# Patient Record
Sex: MALE | Race: WHITE | NOT HISPANIC OR LATINO | Employment: FULL TIME | URBAN - NONMETROPOLITAN AREA
[De-identification: names, ages, dates, MRNs, and addresses within clinical notes are randomized per-mention and may not be internally consistent; named-entity substitution may affect disease eponyms.]

---

## 2023-03-01 ENCOUNTER — APPOINTMENT (EMERGENCY)
Dept: RADIOLOGY | Facility: HOSPITAL | Age: 23
End: 2023-03-01

## 2023-03-01 ENCOUNTER — HOSPITAL ENCOUNTER (EMERGENCY)
Facility: HOSPITAL | Age: 23
Discharge: HOME | End: 2023-03-01
Attending: EMERGENCY MEDICINE | Admitting: EMERGENCY MEDICINE

## 2023-03-01 ENCOUNTER — APPOINTMENT (EMERGENCY)
Dept: CT IMAGING | Facility: HOSPITAL | Age: 23
End: 2023-03-01

## 2023-03-01 VITALS
TEMPERATURE: 97.9 F | HEART RATE: 55 BPM | SYSTOLIC BLOOD PRESSURE: 128 MMHG | OXYGEN SATURATION: 99 % | DIASTOLIC BLOOD PRESSURE: 80 MMHG | RESPIRATION RATE: 16 BRPM | WEIGHT: 131.84 LBS

## 2023-03-01 DIAGNOSIS — R07.9 CHEST PAIN: Primary | ICD-10-CM

## 2023-03-01 LAB
ALBUMIN SERPL BCP-MCNC: 4.7 G/DL (ref 3.5–5)
ALP SERPL-CCNC: 70 U/L (ref 34–104)
ALT SERPL W P-5'-P-CCNC: 10 U/L (ref 7–52)
ANION GAP SERPL CALCULATED.3IONS-SCNC: 3 MMOL/L (ref 4–13)
APTT PPP: 29 SECONDS (ref 23–37)
AST SERPL W P-5'-P-CCNC: 14 U/L (ref 13–39)
BASOPHILS # BLD AUTO: 0.04 THOUSANDS/ÂΜL (ref 0–0.1)
BASOPHILS NFR BLD AUTO: 1 % (ref 0–1)
BILIRUB SERPL-MCNC: 1.16 MG/DL (ref 0.2–1)
BNP SERPL-MCNC: 15 PG/ML (ref 0–100)
BUN SERPL-MCNC: 18 MG/DL (ref 5–25)
CALCIUM SERPL-MCNC: 9.6 MG/DL (ref 8.4–10.2)
CARDIAC TROPONIN I PNL SERPL HS: <2 NG/L
CHLORIDE SERPL-SCNC: 104 MMOL/L (ref 96–108)
CK SERPL-CCNC: 109 U/L (ref 39–308)
CO2 SERPL-SCNC: 32 MMOL/L (ref 21–32)
CREAT SERPL-MCNC: 1.13 MG/DL (ref 0.6–1.3)
D DIMER PPP FEU-MCNC: 0.65 UG/ML FEU
EOSINOPHIL # BLD AUTO: 0.02 THOUSAND/ÂΜL (ref 0–0.61)
EOSINOPHIL NFR BLD AUTO: 1 % (ref 0–6)
ERYTHROCYTE [DISTWIDTH] IN BLOOD BY AUTOMATED COUNT: 12.3 % (ref 11.6–15.1)
GFR SERPL CREATININE-BSD FRML MDRD: 91 ML/MIN/1.73SQ M
GLUCOSE SERPL-MCNC: 68 MG/DL (ref 65–140)
HCT VFR BLD AUTO: 45.6 % (ref 36.5–49.3)
HGB BLD-MCNC: 14.9 G/DL (ref 12–17)
IMM GRANULOCYTES # BLD AUTO: 0.01 THOUSAND/UL (ref 0–0.2)
IMM GRANULOCYTES NFR BLD AUTO: 0 % (ref 0–2)
INR PPP: 1.1 (ref 0.84–1.19)
LACTATE SERPL-SCNC: 0.8 MMOL/L (ref 0.5–2)
LYMPHOCYTES # BLD AUTO: 1.22 THOUSANDS/ÂΜL (ref 0.6–4.47)
LYMPHOCYTES NFR BLD AUTO: 28 % (ref 14–44)
MCH RBC QN AUTO: 29.3 PG (ref 26.8–34.3)
MCHC RBC AUTO-ENTMCNC: 32.7 G/DL (ref 31.4–37.4)
MCV RBC AUTO: 90 FL (ref 82–98)
MONOCYTES # BLD AUTO: 0.3 THOUSAND/ÂΜL (ref 0.17–1.22)
MONOCYTES NFR BLD AUTO: 7 % (ref 4–12)
NEUTROPHILS # BLD AUTO: 2.83 THOUSANDS/ÂΜL (ref 1.85–7.62)
NEUTS SEG NFR BLD AUTO: 63 % (ref 43–75)
NRBC BLD AUTO-RTO: 0 /100 WBCS
PLATELET # BLD AUTO: 221 THOUSANDS/UL (ref 149–390)
PMV BLD AUTO: 11.2 FL (ref 8.9–12.7)
POTASSIUM SERPL-SCNC: 3.8 MMOL/L (ref 3.5–5.3)
PROT SERPL-MCNC: 7.5 G/DL (ref 6.4–8.4)
PROTHROMBIN TIME: 14.3 SECONDS (ref 11.6–14.5)
RBC # BLD AUTO: 5.08 MILLION/UL (ref 3.88–5.62)
SODIUM SERPL-SCNC: 139 MMOL/L (ref 135–147)
WBC # BLD AUTO: 4.42 THOUSAND/UL (ref 4.31–10.16)

## 2023-03-01 RX ADMIN — IOHEXOL 65 ML: 350 INJECTION, SOLUTION INTRAVENOUS at 15:22

## 2023-03-01 RX ADMIN — SODIUM CHLORIDE 1000 ML: 0.9 INJECTION, SOLUTION INTRAVENOUS at 14:13

## 2023-03-01 NOTE — Clinical Note
Taychau Samuel was seen and treated in our emergency department on 3/1/2023  off work today    Diagnosis:     Everette    He may return on this date: If you have any questions or concerns, please don't hesitate to call        Carina Peters DO    ______________________________           _______________          _______________  Hospital Representative                              Date                                Time

## 2023-03-01 NOTE — ED PROVIDER NOTES
History  Chief Complaint   Patient presents with   • Chest Pain     Chest pain started over 2 weeks ago, has not been the the Dr in over 6 years  States his pain feels like a knot in his left chest and pain increased with movement or a deep breath, admit to vaping  Patient is a 76-year-old male no significant past medical or surgical history presents to the emergency department complaining of pain in the left lower chest started about 2 weeks ago intermittent worse with certain movements and deep breathing feels like a dull ache  No shortness of breath no other symptoms  History provided by:  Patient  Chest Pain  Pain location:  L lateral chest  Pain quality: aching and dull    Pain radiates to:  Does not radiate  Pain radiates to the back: no    Pain severity:  Mild  Onset quality:  Gradual  Duration:  2 weeks  Timing:  Constant  Progression:  Worsening  Chronicity:  New  Associated symptoms: no abdominal pain, no cough, no dizziness, no fatigue, no fever, no headache, no nausea, no numbness, no palpitations, no shortness of breath, not vomiting and no weakness        None       History reviewed  No pertinent past medical history  History reviewed  No pertinent surgical history  History reviewed  No pertinent family history  I have reviewed and agree with the history as documented  E-Cigarette/Vaping   • E-Cigarette Use Current Every Day User      E-Cigarette/Vaping Substances   • Nicotine Yes      Social History     Tobacco Use   • Smoking status: Never   • Smokeless tobacco: Never   Vaping Use   • Vaping Use: Every day   • Substances: Nicotine   Substance Use Topics   • Alcohol use: Never   • Drug use: Yes     Types: Marijuana       Review of Systems   Constitutional: Negative for activity change, appetite change, chills, fatigue and fever  HENT: Negative for congestion, ear pain, rhinorrhea and sore throat  Eyes: Negative for discharge, redness and visual disturbance     Respiratory: Negative for cough, chest tightness, shortness of breath and wheezing  Cardiovascular: Positive for chest pain  Negative for palpitations  Gastrointestinal: Negative for abdominal pain, constipation, diarrhea, nausea and vomiting  Endocrine: Negative for polydipsia and polyuria  Genitourinary: Negative for difficulty urinating, dysuria, frequency, hematuria and urgency  Musculoskeletal: Negative for arthralgias and myalgias  Skin: Negative for color change, pallor and rash  Neurological: Negative for dizziness, weakness, light-headedness, numbness and headaches  Hematological: Negative for adenopathy  Does not bruise/bleed easily  All other systems reviewed and are negative  Physical Exam  Physical Exam  Vitals and nursing note reviewed  Constitutional:       Appearance: He is well-developed  HENT:      Head: Normocephalic and atraumatic  Right Ear: External ear normal       Left Ear: External ear normal       Nose: Nose normal    Eyes:      Conjunctiva/sclera: Conjunctivae normal       Pupils: Pupils are equal, round, and reactive to light  Cardiovascular:      Rate and Rhythm: Normal rate and regular rhythm  Heart sounds: Normal heart sounds  Pulmonary:      Effort: Pulmonary effort is normal  No respiratory distress  Breath sounds: Normal breath sounds  No wheezing or rales  Chest:      Chest wall: Tenderness present  Abdominal:      General: Bowel sounds are normal  There is no distension  Palpations: Abdomen is soft  Tenderness: There is no abdominal tenderness  There is no guarding  Musculoskeletal:         General: Normal range of motion  Cervical back: Normal range of motion and neck supple  Skin:     General: Skin is warm and dry  Neurological:      Mental Status: He is alert and oriented to person, place, and time  Cranial Nerves: No cranial nerve deficit  Sensory: No sensory deficit           Vital Signs  ED Triage Vitals [03/01/23 1324]   Temperature Pulse Respirations Blood Pressure SpO2   97 9 °F (36 6 °C) 63 16 92/51 96 %      Temp Source Heart Rate Source Patient Position - Orthostatic VS BP Location FiO2 (%)   Oral Monitor Sitting Left arm --      Pain Score       6           Vitals:    03/01/23 1324 03/01/23 1500 03/01/23 1515   BP: 92/51 116/70 118/69   Pulse: 63 55 55   Patient Position - Orthostatic VS: Sitting           Visual Acuity      ED Medications  Medications   sodium chloride 0 9 % bolus 1,000 mL (1,000 mL Intravenous New Bag 3/1/23 1413)   iohexol (OMNIPAQUE) 350 MG/ML injection (SINGLE-DOSE) 65 mL (65 mL Intravenous Given 3/1/23 1522)       Diagnostic Studies  Results Reviewed     Procedure Component Value Units Date/Time    D-Dimer [[id number]]  (Abnormal) Collected: 03/01/23 1413    Lab Status: Final result Specimen: Blood from Arm, Left Updated: 03/01/23 1500     D-Dimer, Quant 0 65 ug/ml FEU     Protime-INR [[id number]]  (Normal) Collected: 03/01/23 1413    Lab Status: Final result Specimen: Blood from Arm, Left Updated: 03/01/23 1453     Protime 14 3 seconds      INR 1 10    APTT [[id number]]  (Normal) Collected: 03/01/23 1413    Lab Status: Final result Specimen: Blood from Arm, Left Updated: 03/01/23 1453     PTT 29 seconds     HS Troponin 0hr (reflex protocol) [[id number]]  (Normal) Collected: 03/01/23 1413    Lab Status: Final result Specimen: Blood from Arm, Left Updated: 03/01/23 1450     hs TnI 0hr <2 ng/L     B-Type Natriuretic Peptide(BNP) [[id number]]  (Normal) Collected: 03/01/23 1413    Lab Status: Final result Specimen: Blood from Arm, Left Updated: 03/01/23 1449     BNP 15 pg/mL     Lactic acid [[id number]]  (Normal) Collected: 03/01/23 1413    Lab Status: Final result Specimen: Blood from Arm, Left Updated: 03/01/23 1447     LACTIC ACID 0 8 mmol/L     Narrative:      Result may be elevated if tourniquet was used during collection      Comprehensive metabolic panel [[id number]]  (Abnormal) Collected: 03/01/23 1413    Lab Status: Final result Specimen: Blood from Arm, Left Updated: 03/01/23 1447     Sodium 139 mmol/L      Potassium 3 8 mmol/L      Chloride 104 mmol/L      CO2 32 mmol/L      ANION GAP 3 mmol/L      BUN 18 mg/dL      Creatinine 1 13 mg/dL      Glucose 68 mg/dL      Calcium 9 6 mg/dL      AST 14 U/L      ALT 10 U/L      Alkaline Phosphatase 70 U/L      Total Protein 7 5 g/dL      Albumin 4 7 g/dL      Total Bilirubin 1 16 mg/dL      eGFR 91 ml/min/1 73sq m     Narrative:      National Kidney Disease Foundation guidelines for Chronic Kidney Disease (CKD):   •  Stage 1 with normal or high GFR (GFR > 90 mL/min/1 73 square meters)  •  Stage 2 Mild CKD (GFR = 60-89 mL/min/1 73 square meters)  •  Stage 3A Moderate CKD (GFR = 45-59 mL/min/1 73 square meters)  •  Stage 3B Moderate CKD (GFR = 30-44 mL/min/1 73 square meters)  •  Stage 4 Severe CKD (GFR = 15-29 mL/min/1 73 square meters)  •  Stage 5 End Stage CKD (GFR <15 mL/min/1 73 square meters)  Note: GFR calculation is accurate only with a steady state creatinine    CK Total with Reflex CKMB [[id number]]  (Normal) Collected: 03/01/23 1413    Lab Status: Final result Specimen: Blood from Arm, Left Updated: 03/01/23 1447     Total  U/L     CBC and differential [[id number]] Collected: 03/01/23 1413    Lab Status: Final result Specimen: Blood from Arm, Left Updated: 03/01/23 1422     WBC 4 42 Thousand/uL      RBC 5 08 Million/uL      Hemoglobin 14 9 g/dL      Hematocrit 45 6 %      MCV 90 fL      MCH 29 3 pg      MCHC 32 7 g/dL      RDW 12 3 %      MPV 11 2 fL      Platelets 209 Thousands/uL      nRBC 0 /100 WBCs      Neutrophils Relative 63 %      Immat GRANS % 0 %      Lymphocytes Relative 28 %      Monocytes Relative 7 %      Eosinophils Relative 1 %      Basophils Relative 1 %      Neutrophils Absolute 2 83 Thousands/µL      Immature Grans Absolute 0 01 Thousand/uL      Lymphocytes Absolute 1 22 Thousands/µL      Monocytes Absolute 0 30 Thousand/µL Eosinophils Absolute 0 02 Thousand/µL      Basophils Absolute 0 04 Thousands/µL                  CTA ED chest PE study   Final Result by Deandre Orozco MD (03/01 1886)      No pulmonary embolus  Lungs clear  Workstation performed: [id number]         XR chest 1 view portable   ED Interpretation by Sharyn Chakraborty DO (03/01 1506)   No acute cardiopulmonary disease                 Procedures  ECG 12 Lead Documentation Only    Date/Time: 3/1/2023 2:05 PM  Performed by: Sharyn Chakraborty DO  Authorized by: Sharyn Chakraborty DO     ECG reviewed by me, the ED Provider: yes    Patient location:  ED  Previous ECG:     Comparison to cardiac monitor: Yes    Quality:     Tracing quality:  Limited by artifact  Rate:     ECG rate:  56    ECG rate assessment: bradycardic    QRS:     QRS axis:  Normal    QRS intervals:  Normal  Conduction:     Conduction: normal    ST segments:     ST segments:  Normal  T waves:     T waves: normal    Other findings:     Other findings: early repolarization               ED Course             HEART Risk Score    Flowsheet Row Most Recent Value   Heart Score Risk Calculator    History 0 Filed at: 03/01/2023 1506   ECG 0 Filed at: 03/01/2023 1506   Age 0 Filed at: 03/01/2023 1506   Risk Factors 0 Filed at: 03/01/2023 1506   Troponin 0 Filed at: 03/01/2023 1506   HEART Score 0 Filed at: 03/01/2023 1506                                      Medical Decision Making  Remained clinically and hemodynamically stable in the emergency department work-up in the ED reveals no evidence of acute cardiopulmonary pathology heart score 0 very low risk for major adverse cardiac event symptoms ongoing greater than 3 hours actually 2 weeks now    Negative high-sensitivity troponin in the ED recommendation based on ACS algorithm is for DC with PCP follow-up patient comfortable and in agreement with this chest pain is somewhat reproducible may be musculoskeletal in nature advised port of care and prompt follow-up with primary physician for further evaluation and treatment and obtain test results return precautions and anticipatory guidance discussed  Chest pain: acute illness or injury  Amount and/or Complexity of Data Reviewed  Labs: ordered  Decision-making details documented in ED Course  Radiology: ordered and independent interpretation performed  Decision-making details documented in ED Course  ECG/medicine tests: ordered and independent interpretation performed  Decision-making details documented in ED Course  Risk  Prescription drug management  Disposition  Final diagnoses:   Chest pain     Time reflects when diagnosis was documented in both MDM as applicable and the Disposition within this note     Time User Action Codes Description Comment    3/1/2023  3:07 PM Virgil Pro Add [R07 9] Chest pain       ED Disposition     ED Disposition   Discharge    Condition   Stable    Date/Time   Wed Mar 1, 2023  3:45 PM    Comment   Everette Bass discharge to home/self care  Follow-up Information     Follow up With Specialties Details Why [address] [facility]  Family Medicine Schedule an appointment as soon as possible for a visit in 3 days  [address] [facility] [address] [phone]            Patient's Medications    No medications on file       No discharge procedures on file      PDMP Review     None          ED Provider  Electronically Signed by               Joe Pineda DO  03/01/23 7336

## 2023-03-10 LAB
ATRIAL RATE: 56 BPM
P AXIS: 75 DEGREES
PR INTERVAL: 146 MS
QRS AXIS: 84 DEGREES
QRSD INTERVAL: 96 MS
QT INTERVAL: 426 MS
QTC INTERVAL: 411 MS
T WAVE AXIS: 69 DEGREES
VENTRICULAR RATE: 56 BPM

## 2024-03-07 ENCOUNTER — OFFICE VISIT (OUTPATIENT)
Dept: URGENT CARE | Facility: CLINIC | Age: 24
End: 2024-03-07
Payer: COMMERCIAL

## 2024-03-07 VITALS
RESPIRATION RATE: 18 BRPM | DIASTOLIC BLOOD PRESSURE: 62 MMHG | HEIGHT: 72 IN | OXYGEN SATURATION: 99 % | TEMPERATURE: 97 F | WEIGHT: 140 LBS | HEART RATE: 94 BPM | SYSTOLIC BLOOD PRESSURE: 118 MMHG | BODY MASS INDEX: 18.96 KG/M2

## 2024-03-07 DIAGNOSIS — K08.89 PAIN, DENTAL: Primary | ICD-10-CM

## 2024-03-07 PROCEDURE — 99213 OFFICE O/P EST LOW 20 MIN: CPT

## 2024-03-07 RX ORDER — AMOXICILLIN AND CLAVULANATE POTASSIUM 875; 125 MG/1; MG/1
1 TABLET, FILM COATED ORAL EVERY 12 HOURS SCHEDULED
Qty: 14 TABLET | Refills: 0 | Status: SHIPPED | OUTPATIENT
Start: 2024-03-07 | End: 2024-03-14

## 2024-03-07 NOTE — PATIENT INSTRUCTIONS
Take antibiotic as prescribed  OTC Tylenol/Ibuprofen for pain  Warm or cool compresses  Warm salt water rinses  Follow up with Dentistry  Monitor for signs of worsening infection   Follow up with PCP in 3-5 days.  Proceed to  ER if symptoms worsen.    If tests have been performed at [facility], our office will contact you with results if changes need to be made to the care plan discussed with you at the visit.  You can review your full results on [facility] MyChart.    Dental Abscess   AMBULATORY CARE:   A dental abscess  is a collection of pus in or around a tooth. A dental abscess is caused by bacteria. The bacteria can enter the tooth when the enamel (outer part of the tooth) is damaged by tooth decay. Bacteria can also enter the tooth through a chip in the tooth or a cut in the gum. Food particles that are stuck between the teeth for a long time may also lead to an abscess.        Common signs and symptoms:   Toothache, a loose tooth, or a tooth that is very sensitive to pressure or temperature    Bad breath, unpleasant taste, and drooling    Fever    Pain, redness, and swelling of the gums, or swelling of your face and neck    Pain when you open or close your mouth    Trouble opening your mouth    Seek care immediately if:   You have severe pain in your tooth or jaw.    You have trouble breathing because of pain or swelling.    Call your doctor if:   Your symptoms get worse, even after treatment.    Your mouth is bleeding.    You cannot eat or drink because of pain or swelling.    Your abscess returns.    You have an injury that causes a crack in your tooth.    You have questions or concerns about your condition or care.    Treatment:  You may  need any of the following:  Medicines  may be given to treat a bacterial infection and decrease pain.     Incision and drainage  is a cut in the abscess to allow the pus to drain. A sample of fluid may be collected from your abscess. The fluid is sent to a lab and tested  for bacteria. Ask your healthcare provider for more information.    A root canal  is a procedure to remove the bacteria and prevent more infection. It is usually done after an incision and drainage. A filling or crown will be placed over the tooth after you have healed from your root canal.     Tooth removal  may be needed if the infection affects deeper tissues. This is usually done after an incision and drainage.    Self-care:   Rinse your mouth every 2 hours with salt water.  This will help keep the area clean.     Gently brush your teeth twice a day with a soft tooth brush.  This will help keep the area clean.     Eat soft foods as directed.  Soft foods may cause less pain. Examples include applesauce, yogurt, and cooked pasta. Ask your healthcare provider how long to follow this instruction.     Apply a warm compress to your tooth or gum.  Use a cotton ball or gauze soaked in warm water. Remove the compress in 10 minutes or when it becomes cool. Repeat 3 times a day.    Prevent another abscess:   Brush your teeth at least 2 times a day  with fluoride toothpaste.    Use dental floss at least once a day  to clean between your teeth.    Rinse your mouth with water or mouthwash  after meals and snacks. Chew sugarless gum.    Avoid sugary and starchy food that can stick between your teeth.  Limit drinks high in sugar, such as soda or fruit juice.    See your dentist every 6 months  for dental cleanings and oral exams.    Follow up with your doctor or dentist as directed:  Your healthcare provider will need to check your teeth and gums. Write down your questions so you remember to ask them during your visits.   © Copyright [employer] 2023 Information is for End User's use only and may not be sold, redistributed or otherwise used for commercial purposes.  The above information is an  only. It is not intended as medical advice for individual conditions or treatments. Talk to your doctor, nurse or  pharmacist before following any medical regimen to see if it is safe and effective for you.

## 2024-03-07 NOTE — LETTER
March 11, 2024     Patient: Everette Carson   YOB: 2000   Date of Visit: 3/7/2024       To Whom It May Concern:    It is my medical opinion that Everette Carson may return to work on 03/09/24 .    If you have any questions or concerns, please don't hesitate to call.         Sincerely,        ANJEL Agrawal    CC: No Recipients

## 2024-03-07 NOTE — LETTER
March 7, 2024     Patient: Everette Carson   YOB: 2000   Date of Visit: 3/7/2024       To Whom it May Concern:    Everette Carson was seen in my clinic on 3/7/2024. He may return to work on 3/8/2024 .    If you have any questions or concerns, please don't hesitate to call.         Sincerely,          ANEJL Agrawal        CC: No Recipients

## 2024-03-07 NOTE — PROGRESS NOTES
[facility]        NAME: Everette Carson is a 23 y.o. male  : 2000    MRN: [id number]  DATE: 2024  TIME: 1:00 PM    Assessment and Plan   Pain, dental [K08.89]  1. Pain, dental  amoxicillin-clavulanate (AUGMENTIN) 875-125 mg per tablet        Concern for dental infection and so we will treat with Augmentin.  Follow-up with dentistry, list of dental clinics provided. Encouraged continued supportive measures.  Follow up with PCP in 3-5 days or proceed to emergency department for worsening symptoms.  Patient verbalized understanding of instructions given.       Patient Instructions     Patient Instructions     Take antibiotic as prescribed  OTC Tylenol/Ibuprofen for pain  Warm or cool compresses  Warm salt water rinses  Follow up with Dentistry  Monitor for signs of worsening infection   Follow up with PCP in 3-5 days.  Proceed to  ER if symptoms worsen.    If tests have been performed at [facility], our office will contact you with results if changes need to be made to the care plan discussed with you at the visit.  You can review your full results on [facility] MyChart.    Dental Abscess   AMBULATORY CARE:   A dental abscess  is a collection of pus in or around a tooth. A dental abscess is caused by bacteria. The bacteria can enter the tooth when the enamel (outer part of the tooth) is damaged by tooth decay. Bacteria can also enter the tooth through a chip in the tooth or a cut in the gum. Food particles that are stuck between the teeth for a long time may also lead to an abscess.        Common signs and symptoms:   Toothache, a loose tooth, or a tooth that is very sensitive to pressure or temperature    Bad breath, unpleasant taste, and drooling    Fever    Pain, redness, and swelling of the gums, or swelling of your face and neck    Pain when you open or close your mouth    Trouble opening your mouth    Seek care immediately if:   You have severe pain in your tooth or jaw.    You have  trouble breathing because of pain or swelling.    Call your doctor if:   Your symptoms get worse, even after treatment.    Your mouth is bleeding.    You cannot eat or drink because of pain or swelling.    Your abscess returns.    You have an injury that causes a crack in your tooth.    You have questions or concerns about your condition or care.    Treatment:  You may  need any of the following:  Medicines  may be given to treat a bacterial infection and decrease pain.     Incision and drainage  is a cut in the abscess to allow the pus to drain. A sample of fluid may be collected from your abscess. The fluid is sent to a lab and tested for bacteria. Ask your healthcare provider for more information.    A root canal  is a procedure to remove the bacteria and prevent more infection. It is usually done after an incision and drainage. A filling or crown will be placed over the tooth after you have healed from your root canal.     Tooth removal  may be needed if the infection affects deeper tissues. This is usually done after an incision and drainage.    Self-care:   Rinse your mouth every 2 hours with salt water.  This will help keep the area clean.     Gently brush your teeth twice a day with a soft tooth brush.  This will help keep the area clean.     Eat soft foods as directed.  Soft foods may cause less pain. Examples include applesauce, yogurt, and cooked pasta. Ask your healthcare provider how long to follow this instruction.     Apply a warm compress to your tooth or gum.  Use a cotton ball or gauze soaked in warm water. Remove the compress in 10 minutes or when it becomes cool. Repeat 3 times a day.    Prevent another abscess:   Brush your teeth at least 2 times a day  with fluoride toothpaste.    Use dental floss at least once a day  to clean between your teeth.    Rinse your mouth with water or mouthwash  after meals and snacks. Chew sugarless gum.    Avoid sugary and starchy food that can stick between your  teeth.  Limit drinks high in sugar, such as soda or fruit juice.    See your dentist every 6 months  for dental cleanings and oral exams.    Follow up with your doctor or dentist as directed:  Your healthcare provider will need to check your teeth and gums. Write down your questions so you remember to ask them during your visits.   © Copyright [employer] 2023 Information is for End User's use only and may not be sold, redistributed or otherwise used for commercial purposes.  The above information is an  only. It is not intended as medical advice for individual conditions or treatments. Talk to your doctor, nurse or pharmacist before following any medical regimen to see if it is safe and effective for you.        Chief Complaint     Chief Complaint   Patient presents with    Dental Pain     C/o upper left tooth pain, with throbbing. Onset yesterday.          History of Present Illness       3-year-old male with no significant past medical history presents with complaints of left-sided upper dental pain x 2 days.  Patient reports history of root canal but does not follow regularly with dentist.  Denies fever, chills, or flulike symptoms.  No facial swelling.  He has been taking OTC Ibuprofen and did take a dose of a coworker's antibiotic (does not know which) and this provided some relief of symptoms.         Review of Systems   Review of Systems   Constitutional:  Negative for chills and fever.   HENT:  Positive for dental problem. Negative for congestion, ear discharge, ear pain, facial swelling, rhinorrhea, sore throat, trouble swallowing and voice change.    Eyes:  Negative for discharge.   Respiratory:  Negative for cough, shortness of breath and wheezing.    Cardiovascular:  Negative for chest pain.   Gastrointestinal:  Negative for abdominal pain, diarrhea, nausea and vomiting.   Skin:  Negative for rash.         Current Medications       Current Outpatient Medications:     amoxicillin-clavulanate  (AUGMENTIN) 875-125 mg per tablet, Take 1 tablet by mouth every 12 (twelve) hours for 7 days, Disp: 14 tablet, Rfl: 0    Current Allergies     Allergies as of 03/07/2024    (No Known Allergies)            The following portions of the patient's history were reviewed and updated as appropriate: allergies, current medications, past family history, past medical history, past social history, past surgical history and problem list.     History reviewed. No pertinent past medical history.    Past Surgical History:   Procedure Laterality Date    NO PAST SURGERIES         History reviewed. No pertinent family history.      Medications have been verified.        Objective   /62   Pulse 94   Temp (!) 97 °F (36.1 °C)   Resp 18   Ht 6' (1.829 m)   Wt 63.5 kg (140 lb)   SpO2 99%   BMI 18.99 kg/m²   No LMP for male patient.       Physical Exam     Physical Exam  Vitals and nursing note reviewed.   Constitutional:       General: He is not in acute distress.     Appearance: He is not toxic-appearing.   HENT:      Head: Normocephalic.      Nose: Nose normal.      Mouth/Throat:      Mouth: Mucous membranes are moist.      Dentition: Abnormal dentition. Dental tenderness and gingival swelling present.      Pharynx: Oropharynx is clear.     Eyes:      Conjunctiva/sclera: Conjunctivae normal.   Pulmonary:      Effort: Pulmonary effort is normal.   Skin:     General: Skin is warm and dry.   Neurological:      Mental Status: He is alert and oriented to person, place, and time.      Gait: Gait is intact.   Psychiatric:         Mood and Affect: Mood normal.         Behavior: Behavior normal.

## 2024-09-26 ENCOUNTER — APPOINTMENT (OUTPATIENT)
Dept: RADIOLOGY | Facility: CLINIC | Age: 24
End: 2024-09-26

## 2024-09-26 ENCOUNTER — APPOINTMENT (OUTPATIENT)
Dept: URGENT CARE | Facility: CLINIC | Age: 24
End: 2024-09-26

## 2024-09-26 ENCOUNTER — APPOINTMENT (OUTPATIENT)
Dept: PHYSICAL THERAPY | Facility: CLINIC | Age: 24
End: 2024-09-26

## 2024-09-26 DIAGNOSIS — Z02.1 PRE-EMPLOYMENT EXAMINATION: ICD-10-CM

## 2024-09-26 PROCEDURE — 97530 THERAPEUTIC ACTIVITIES: CPT

## 2024-09-26 PROCEDURE — 71046 X-RAY EXAM CHEST 2 VIEWS: CPT

## 2024-11-05 ENCOUNTER — APPOINTMENT (OUTPATIENT)
Dept: RADIOLOGY | Facility: CLINIC | Age: 24
End: 2024-11-05
Payer: COMMERCIAL

## 2024-11-05 ENCOUNTER — OFFICE VISIT (OUTPATIENT)
Dept: URGENT CARE | Facility: CLINIC | Age: 24
End: 2024-11-05
Payer: COMMERCIAL

## 2024-11-05 VITALS
HEIGHT: 71 IN | DIASTOLIC BLOOD PRESSURE: 80 MMHG | RESPIRATION RATE: 16 BRPM | SYSTOLIC BLOOD PRESSURE: 120 MMHG | OXYGEN SATURATION: 99 % | HEART RATE: 83 BPM | TEMPERATURE: 97.6 F | BODY MASS INDEX: 18.9 KG/M2 | WEIGHT: 135 LBS

## 2024-11-05 VITALS
OXYGEN SATURATION: 97 % | SYSTOLIC BLOOD PRESSURE: 128 MMHG | WEIGHT: 135 LBS | TEMPERATURE: 98.2 F | HEART RATE: 69 BPM | HEIGHT: 71 IN | DIASTOLIC BLOOD PRESSURE: 64 MMHG | BODY MASS INDEX: 18.9 KG/M2
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
## 2024-11-05 DIAGNOSIS — S62.511A CLOSED DISPLACED FRACTURE OF PROXIMAL PHALANX OF RIGHT THUMB, INITIAL ENCOUNTER: ICD-10-CM

## 2024-11-05 DIAGNOSIS — S62.511A CLOSED DISPLACED FRACTURE OF PROXIMAL PHALANX OF RIGHT THUMB, INITIAL ENCOUNTER: Primary | ICD-10-CM

## 2024-11-05 PROCEDURE — S9083 URGENT CARE CENTER GLOBAL: HCPCS

## 2024-11-05 PROCEDURE — 73140 X-RAY EXAM OF FINGER(S): CPT

## 2024-11-05 PROCEDURE — G0382 LEV 3 HOSP TYPE B ED VISIT: HCPCS

## 2024-11-05 PROCEDURE — 99204 OFFICE O/P NEW MOD 45 MIN: CPT | Performed by: STUDENT IN AN ORGANIZED HEALTH CARE EDUCATION/TRAINING PROGRAM

## 2024-11-05 RX ORDER — CEFAZOLIN SODIUM 2 G/50ML
2000 SOLUTION INTRAVENOUS ONCE
Status: CANCELLED | OUTPATIENT
Start: 2024-11-08 | End: 2024-11-05

## 2024-11-05 RX ORDER — CHLORHEXIDINE GLUCONATE ORAL RINSE 1.2 MG/ML
15 SOLUTION DENTAL ONCE
Status: CANCELLED | OUTPATIENT
Start: 2024-11-05 | End: 2024-11-05

## 2024-11-05 NOTE — PATIENT INSTRUCTIONS
"  Preliminary XR read concerning for fracture of thumb, final read pending  Thumb brace  Rest, Ice, Compression, and Elevation  OTC Tylenol/Ibuprofen for pain  Referral placed to Orthopedic Surgery (Hand)   Follow up with PCP in 3-5 days.  Proceed to  ER if symptoms worsen.    If tests have been performed at [facility], our office will contact you with results if changes need to be made to the care plan discussed with you at the visit.  You can review your full results on [facility] MyChart.      Patient Education     Finger fracture   The Basics   Written by the doctors and editors at [facility]   What is a finger fracture? -- A \"fracture\" is another word for a broken bone. The finger bones are also called the \"phalanges\" (figure 1).  There are different types of fractures, depending on which bone breaks and how it breaks. When a bone breaks, it might crack, break all of the way through, or shatter.  Fractured fingers can happen if a finger is hit, twisted, or bent too far.  What are the symptoms of a finger fracture? -- Symptoms depend on which bone breaks and the kind of break it is. Common symptoms can include:   Pain, swelling, or bruising over the area   Finger looks bent in an abnormal position or is not the usual shape   Not being able to bend or move the finger   Trouble making a fist or grasping things with the fingers   Numbness in the area of the broken bone  Is there a test for a finger fracture? -- Yes. Your doctor or nurse will ask about your symptoms, do an exam, and do an X-ray. They might do other imaging tests, such as a CT, MRI, or ultrasound. Imaging tests create pictures of the inside of the body.  How are finger fractures treated? -- Treatment depends, in part, on the type of finger fracture you have and how severe it is. The goal of treatment is to have the ends of the broken bone line up with each other so that the bone can heal.  If the ends of the broken bone are already in line with each " "other, finger fractures are usually treated with a splint, \"adri taping,\" or both. Adri taping involves taping your injured finger to the finger next to it (picture 1).  If the ends of your broken bone are not in line with each other, the doctor will need to line them up:   Sometimes, the doctor can move the bone to the correct position without doing surgery, and then put a splint on or adri tape your fingers. This is called \"closed fracture reduction.\"   A severe finger fracture, in which a joint is damaged or the bones do not stay in position, is treated with surgery. During surgery, the doctor puts the finger bone back in position. To do this, they can use screws, pins, rods, or plates to fix the bones inside the finger. This is called \"open fracture reduction.\"  How long do finger fractures take to heal? -- Most finger fractures take weeks to months to heal, depending on the type of fracture. The doctor or nurse will talk to you about when to return to things like work, sports, or other activities.  Healing time also depends on the person. Healthy children usually heal much more quickly than older adults or adults with other medical problems.  How do I care for myself at home? -- To care for yourself or your child at home:   Follow the doctor's instructions for wearing the splint or adri taping the finger. This supports and protects the bone as it heals.   Follow instructions for limiting activity and movement until the bone is healed. The doctor or nurse will tell you what activities are safe to do.   Prop the injured hand on pillows, keeping it above the level of the heart. This might help lessen pain and swelling.   The doctor might recommend an over-the-counter pain medicine. These include acetaminophen (sample brand name: Tylenol), ibuprofen (sample brand names: Advil, Motrin), and naproxen (sample brand name: Aleve).   Some people get a prescription for stronger pain medicines to take for a short time. " "Follow the instructions for taking these medicines.   Ice can help with pain and swelling:   Put a cold gel pack, bag of ice, or bag of frozen vegetables on the injured area every 1 to 2 hours, for 15 minutes each time. Put a thin towel between the ice (or other cold object) and your skin.   Use the ice (or other cold object) for at least 6 hours after your injury. Some people find it helpful to ice longer, even up to 2 days after their injury.   Eat a healthy diet that includes plenty of calcium, vitamin D, and protein (figure 2).   If you smoke, try to quit. Broken bones take longer to heal if you smoke.   You might need to work with a physical therapist (exercise expert) after your fracture heals. The physical therapist will show you exercises and stretches to strengthen the hand and finger muscles and keep them from getting stiff.  When should I call the doctor? -- Call for advice if:   There is less feeling or movement in your fingers.   The finger becomes swollen or starts to hurt more.   The splint becomes too tight and uncomfortable.   The fingers are numb or tingly, or turn pale, blue, or gray.  All topics are updated as new evidence becomes available and our peer review process is complete.  This topic retrieved from [employer] on: Feb 26, 2024.  Topic 59814 Version 11.0  Release: 32.2.4 - C32.56  © 2024 UpToDate, Inc. and/or its affiliates. All rights reserved.  figure 1: Hand and wrist bones     This drawing shows the bones of the hand and wrist.  Graphic 43742 Version 2.0  picture 1: Finger adri taping     Finger adri taping involves taping an injured finger to the finger next to it.  Graphic 78134 Version 2.0  figure 2: Foods and drinks with calcium and vitamin D     Foods rich in calcium include ice cream, soy milk, breads, kale, broccoli, milk, cheese, cottage cheese, almonds, yogurt, ready-to-eat cereals, beans, and tofu. Foods rich in vitamin D include milk, fortified plant-based \"milks\" (soy, " almond), canned tuna fish, cod liver oil, yogurt, ready-to-eat-cereals, cooked salmon, canned sardines, mackerel, and eggs. Some of these foods are rich in both.  Graphic 64815 Version 4.0  Consumer Information Use and Disclaimer   Disclaimer: This generalized information is a limited summary of diagnosis, treatment, and/or medication information. It is not meant to be comprehensive and should be used as a tool to help the user understand and/or assess potential diagnostic and treatment options. It does NOT include all information about conditions, treatments, medications, side effects, or risks that may apply to a specific patient. It is not intended to be medical advice or a substitute for the medical advice, diagnosis, or treatment of a health care provider based on the health care provider's examination and assessment of a patient's specific and unique circumstances. Patients must speak with a health care provider for complete information about their health, medical questions, and treatment options, including any risks or benefits regarding use of medications. This information does not endorse any treatments or medications as safe, effective, or approved for treating a specific patient. UpToDate, Inc. and its affiliates disclaim any warranty or liability relating to this information or the use thereof.The use of this information is governed by the Terms of Use, available at https://www.woltersMuckRockuwer.com/en/know/clinical-effectiveness-terms. 2024© UpToDate, Inc. and its affiliates and/or licensors. All rights reserved.  Copyright   © 2024 UpToDate, Inc. and/or its affiliates. All rights reserved.

## 2024-11-05 NOTE — LETTER
November 5, 2024     Patient: Everette Carson   YOB: 2000   Date of Visit: 11/5/2024       To Whom it May Concern:    Everette Carson was seen in my clinic on 11/5/2024. He may return to work on 11/6/24 .    If you have any questions or concerns, please don't hesitate to call.         Sincerely,          ANJEL Agrawal        CC: No Recipients
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
## 2024-11-06 RX ORDER — IBUPROFEN 200 MG
TABLET ORAL EVERY 6 HOURS PRN
COMMUNITY
End: 2024-11-08

## 2024-11-06 RX ORDER — ACETAMINOPHEN 500 MG
500 TABLET ORAL EVERY 6 HOURS PRN
Status: ON HOLD | COMMUNITY
End: 2024-11-08
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
## 2024-11-07 ENCOUNTER — ANESTHESIA EVENT (OUTPATIENT)
Dept: PERIOP | Facility: HOSPITAL | Age: 24
End: 2024-11-07
Payer: COMMERCIAL

## 2024-11-08 ENCOUNTER — APPOINTMENT (OUTPATIENT)
Dept: RADIOLOGY | Facility: HOSPITAL | Age: 24
End: 2024-11-08
Payer: COMMERCIAL

## 2024-11-08 ENCOUNTER — ANESTHESIA (OUTPATIENT)
Dept: PERIOP | Facility: HOSPITAL | Age: 24
End: 2024-11-08
Payer: COMMERCIAL

## 2024-11-08 ENCOUNTER — HOSPITAL ENCOUNTER (OUTPATIENT)
Facility: HOSPITAL | Age: 24
Setting detail: OUTPATIENT SURGERY
Discharge: HOME | End: 2024-11-08
Attending: STUDENT IN AN ORGANIZED HEALTH CARE EDUCATION/TRAINING PROGRAM | Admitting: STUDENT IN AN ORGANIZED HEALTH CARE EDUCATION/TRAINING PROGRAM
Payer: COMMERCIAL

## 2024-11-08 VITALS
OXYGEN SATURATION: 100 % | RESPIRATION RATE: 16 BRPM | DIASTOLIC BLOOD PRESSURE: 79 MMHG | SYSTOLIC BLOOD PRESSURE: 131 MMHG | TEMPERATURE: 97.3 F | HEART RATE: 62 BPM

## 2024-11-08 DIAGNOSIS — Z87.81 S/P ORIF (OPEN REDUCTION INTERNAL FIXATION) FRACTURE: Primary | ICD-10-CM

## 2024-11-08 DIAGNOSIS — Z98.890 S/P ORIF (OPEN REDUCTION INTERNAL FIXATION) FRACTURE: Primary | ICD-10-CM

## 2024-11-08 PROBLEM — F19.11 SUBSTANCE ABUSE IN REMISSION (HCC): Status: ACTIVE | Noted: 2024-11-08

## 2024-11-08 PROCEDURE — C1713 ANCHOR/SCREW BN/BN,TIS/BN: HCPCS | Performed by: STUDENT IN AN ORGANIZED HEALTH CARE EDUCATION/TRAINING PROGRAM

## 2024-11-08 PROCEDURE — NC001 PR NO CHARGE: Performed by: STUDENT IN AN ORGANIZED HEALTH CARE EDUCATION/TRAINING PROGRAM

## 2024-11-08 PROCEDURE — 73140 X-RAY EXAM OF FINGER(S): CPT

## 2024-11-08 PROCEDURE — 26735 TREAT FINGER FRACTURE EACH: CPT | Performed by: STUDENT IN AN ORGANIZED HEALTH CARE EDUCATION/TRAINING PROGRAM

## 2024-11-08 DEVICE — INFRAME IS A STAINLESS STEEL BONE SCREW.
Type: IMPLANTABLE DEVICE | Site: THUMB | Status: FUNCTIONAL
Brand: INFRAME IMPLANT

## 2024-11-08 RX ORDER — DEXAMETHASONE SODIUM PHOSPHATE 10 MG/ML
INJECTION, SOLUTION INTRAMUSCULAR; INTRAVENOUS AS NEEDED
Status: DISCONTINUED | OUTPATIENT
Start: 2024-11-08 | End: 2024-11-08

## 2024-11-08 RX ORDER — DEXMEDETOMIDINE HYDROCHLORIDE 4 UG/ML
INJECTION, SOLUTION INTRAVENOUS AS NEEDED
Status: DISCONTINUED | OUTPATIENT
Start: 2024-11-08 | End: 2024-11-08

## 2024-11-08 RX ORDER — CHLORHEXIDINE GLUCONATE ORAL RINSE 1.2 MG/ML
15 SOLUTION DENTAL ONCE
Status: COMPLETED | OUTPATIENT
Start: 2024-11-08 | End: 2024-11-08

## 2024-11-08 RX ORDER — ACETAMINOPHEN 10 MG/ML
INJECTION, SOLUTION INTRAVENOUS AS NEEDED
Status: DISCONTINUED | OUTPATIENT
Start: 2024-11-08 | End: 2024-11-08

## 2024-11-08 RX ORDER — FENTANYL CITRATE 50 UG/ML
INJECTION, SOLUTION INTRAMUSCULAR; INTRAVENOUS CONTINUOUS PRN
Status: DISCONTINUED | OUTPATIENT
Start: 2024-11-08 | End: 2024-11-08

## 2024-11-08 RX ORDER — LIDOCAINE HYDROCHLORIDE AND EPINEPHRINE 10; 10 MG/ML; UG/ML
INJECTION, SOLUTION INFILTRATION; PERINEURAL AS NEEDED
Status: DISCONTINUED | OUTPATIENT
Start: 2024-11-08 | End: 2024-11-08 | Stop reason: HOSPADM

## 2024-11-08 RX ORDER — SUCCINYLCHOLINE/SOD CL,ISO/PF 100 MG/5ML
SYRINGE (ML) INTRAVENOUS AS NEEDED
Status: DISCONTINUED | OUTPATIENT
Start: 2024-11-08 | End: 2024-11-08

## 2024-11-08 RX ORDER — SODIUM CHLORIDE, SODIUM LACTATE, POTASSIUM CHLORIDE, CALCIUM CHLORIDE 600; 310; 30; 20 MG/100ML; MG/100ML; MG/100ML; MG/100ML
INJECTION, SOLUTION INTRAVENOUS CONTINUOUS PRN
Status: DISCONTINUED | OUTPATIENT
Start: 2024-11-08 | End: 2024-11-08

## 2024-11-08 RX ORDER — ONDANSETRON 2 MG/ML
4 INJECTION INTRAMUSCULAR; INTRAVENOUS ONCE AS NEEDED
Status: COMPLETED | OUTPATIENT
Start: 2024-11-08 | End: 2024-11-08

## 2024-11-08 RX ORDER — CEFAZOLIN SODIUM 2 G/50ML
2000 SOLUTION INTRAVENOUS ONCE
Status: COMPLETED | OUTPATIENT
Start: 2024-11-08 | End: 2024-11-08

## 2024-11-08 RX ORDER — GLYCOPYRROLATE 0.2 MG/ML
INJECTION INTRAMUSCULAR; INTRAVENOUS AS NEEDED
Status: DISCONTINUED | OUTPATIENT
Start: 2024-11-08 | End: 2024-11-08

## 2024-11-08 RX ORDER — MIDAZOLAM HYDROCHLORIDE 2 MG/2ML
INJECTION, SOLUTION INTRAMUSCULAR; INTRAVENOUS CONTINUOUS PRN
Status: DISCONTINUED | OUTPATIENT
Start: 2024-11-08 | End: 2024-11-08

## 2024-11-08 RX ORDER — DEXAMETHASONE SODIUM PHOSPHATE 4 MG/ML
INJECTION, SOLUTION INTRA-ARTICULAR; INTRALESIONAL; INTRAMUSCULAR; INTRAVENOUS; SOFT TISSUE
Status: DISCONTINUED | OUTPATIENT
Start: 2024-11-08 | End: 2024-11-08

## 2024-11-08 RX ORDER — BUPIVACAINE HYDROCHLORIDE 5 MG/ML
INJECTION, SOLUTION EPIDURAL; INTRACAUDAL
Status: DISCONTINUED | OUTPATIENT
Start: 2024-11-08 | End: 2024-11-08

## 2024-11-08 RX ORDER — LIDOCAINE HYDROCHLORIDE 10 MG/ML
INJECTION, SOLUTION EPIDURAL; INFILTRATION; INTRACAUDAL; PERINEURAL AS NEEDED
Status: DISCONTINUED | OUTPATIENT
Start: 2024-11-08 | End: 2024-11-08

## 2024-11-08 RX ORDER — ONDANSETRON 2 MG/ML
INJECTION INTRAMUSCULAR; INTRAVENOUS AS NEEDED
Status: DISCONTINUED | OUTPATIENT
Start: 2024-11-08 | End: 2024-11-08

## 2024-11-08 RX ORDER — MAGNESIUM HYDROXIDE 1200 MG/15ML
LIQUID ORAL AS NEEDED
Status: DISCONTINUED | OUTPATIENT
Start: 2024-11-08 | End: 2024-11-08 | Stop reason: HOSPADM

## 2024-11-08 RX ORDER — NAPROXEN 500 MG/1
500 TABLET ORAL 2 TIMES DAILY WITH MEALS
Qty: 30 TABLET | Refills: 0 | Status: SHIPPED | OUTPATIENT
Start: 2024-11-08

## 2024-11-08 RX ORDER — ACETAMINOPHEN 500 MG
1000 TABLET ORAL EVERY 8 HOURS PRN
Qty: 40 TABLET | Refills: 0 | Status: SHIPPED | OUTPATIENT
Start: 2024-11-08

## 2024-11-08 RX ORDER — ACETAMINOPHEN 325 MG/1
650 TABLET ORAL EVERY 6 HOURS PRN
Status: DISCONTINUED | OUTPATIENT
Start: 2024-11-08 | End: 2024-11-08 | Stop reason: HOSPADM

## 2024-11-08 RX ORDER — ONDANSETRON 2 MG/ML
4 INJECTION INTRAMUSCULAR; INTRAVENOUS EVERY 6 HOURS PRN
Status: DISCONTINUED | OUTPATIENT
Start: 2024-11-08 | End: 2024-11-08 | Stop reason: HOSPADM

## 2024-11-08 RX ORDER — PROPOFOL 10 MG/ML
INJECTION, EMULSION INTRAVENOUS AS NEEDED
Status: DISCONTINUED | OUTPATIENT
Start: 2024-11-08 | End: 2024-11-08

## 2024-11-08 RX ADMIN — SODIUM CHLORIDE, SODIUM LACTATE, POTASSIUM CHLORIDE, AND CALCIUM CHLORIDE: .6; .31; .03; .02 INJECTION, SOLUTION INTRAVENOUS at 15:02

## 2024-11-08 RX ADMIN — BUPIVACAINE HYDROCHLORIDE 15 ML: 5 INJECTION, SOLUTION EPIDURAL; INTRACAUDAL; PERINEURAL at 17:20

## 2024-11-08 RX ADMIN — ACETAMINOPHEN 1000 MG: 10 INJECTION INTRAVENOUS at 15:57

## 2024-11-08 RX ADMIN — CEFAZOLIN SODIUM 2000 MG: 2 SOLUTION INTRAVENOUS at 15:18

## 2024-11-08 RX ADMIN — ONDANSETRON 4 MG: 2 INJECTION INTRAMUSCULAR; INTRAVENOUS at 16:49

## 2024-11-08 RX ADMIN — DEXMEDETOMIDINE HYDROCHLORIDE 12 MCG: 400 INJECTION INTRAVENOUS at 15:31

## 2024-11-08 RX ADMIN — ONDANSETRON 4 MG: 2 INJECTION INTRAMUSCULAR; INTRAVENOUS at 15:14

## 2024-11-08 RX ADMIN — DEXAMETHASONE SODIUM PHOSPHATE 4 MG: 4 INJECTION, SOLUTION INTRAMUSCULAR; INTRAVENOUS at 17:20

## 2024-11-08 RX ADMIN — DEXMEDETOMIDINE HYDROCHLORIDE 12 MCG: 400 INJECTION INTRAVENOUS at 15:14

## 2024-11-08 RX ADMIN — CHLORHEXIDINE GLUCONATE 15 ML: 1.2 RINSE ORAL at 12:52

## 2024-11-08 RX ADMIN — DEXAMETHASONE SODIUM PHOSPHATE 10 MG: 10 INJECTION, SOLUTION INTRAMUSCULAR; INTRAVENOUS at 15:13

## 2024-11-08 RX ADMIN — Medication 100 MG: at 15:14

## 2024-11-08 RX ADMIN — DEXMEDETOMIDINE HYDROCHLORIDE 16 MCG: 400 INJECTION INTRAVENOUS at 15:26

## 2024-11-08 RX ADMIN — PROPOFOL 240 MG: 10 INJECTION, EMULSION INTRAVENOUS at 15:13

## 2024-11-08 RX ADMIN — LIDOCAINE HYDROCHLORIDE 50 MG: 10 INJECTION, SOLUTION EPIDURAL; INFILTRATION; INTRACAUDAL; PERINEURAL at 15:14

## 2024-11-08 RX ADMIN — GLYCOPYRROLATE 0.2 MG: 0.2 INJECTION, SOLUTION INTRAMUSCULAR; INTRAVENOUS at 15:13
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
## 2024-11-08 NOTE — ANESTHESIA PREPROCEDURE EVALUATION
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         Additional Monitors:     Airway Plan: ETT.           Plan Factors-    Chart reviewed.                      Induction- intravenous.    Postoperative Plan- Plan for postoperative opioid use. Planned trial extubation        Informed Consent- Anesthetic plan and risks discussed with patient.  I personally reviewed this patient with the CRNA. Discussed and agreed on the Anesthesia Plan with the CRNA..

## 2024-11-08 NOTE — OP NOTE
OPERATIVE REPORT  PATIENT NAME: Everette Carson    :  2000  MRN: [id number]  Pt Location: OW OR ROOM 02    SURGERY DATE: 2024    Surgeons and Role:     * Luis Brito MD - Primary    Preop Diagnosis:  Closed displaced fracture of proximal phalanx of right thumb, initial encounter [S62.395A]    Post-Op Diagnosis Codes:     * Closed displaced fracture of proximal phalanx of right thumb, initial encounter [G92.493A]    Procedure(s):  Right - OPEN REDUCTION W/ INTERNAL FIXATION (ORIF)  FINGER - right thumb    Specimen(s):  * No specimens in log *    Estimated Blood Loss:   Minimal    Drains:  * No LDAs found *    Anesthesia Type:   IV Sedation with Anesthesia    Operative Indications:  Closed displaced fracture of proximal phalanx of right thumb, initial encounter [E53.784M]  The patient is a 24-year-old male with an extra-articular proximal phalanx fracture of the right thumb with about 30 degrees of angulation. We discussed the etiology of the fracture and treatment options. We initially discussed conservative management which would consist of a reduction of the finger with cast immobilization. We did discuss that there is a high level instability of this fracture type and a high chance of displacement. However this would be a treatment option. Given the instability another treatment option would be surgical fixation. I discussed that surgical and fixation would involve either pin or screw insertion to obtain anatomic alignment of the thumb. The patient would be able to be mobilized at 2 weeks after the surgery. After discussing the treatment options the patient elected to proceed with surgery.     Perioperative Antibiotics:    2 grams ancef     Operative Findings:  Extraarticular thumb proximal phalanx fracture     Procedure and Technique:  The patient was greeted in the preoperative area. The risks, benefits, and alternatives of the procedure were again discussed in detail with the patient. Risks  include pain, stiffness, swelling, injury to neurovascular structure, infection, need for reoperation, and anesthetic complications. The patient was amenable to proceed. The operative extremity was marked. Patient was brought to the operating room and placed under anesthesia. A tourniquet was applied to the operative extremity. The operative extremity was prepped and draped in the usual sterile fashion. Two grams ancef were administered for pre-operative antibiotic prophylaxis. A timeout was performed identifying the name and laterality of the procedure. We did not inflate the tourniquet at this time.     X-ray fluoroscopy was used to evaluate the finger fracture.  Traction and manual manipulation were then used to reduce the finger fracture.  The Acumed in frame K wire was then inserted from a volar radial base of the proximal phalanx across the reduced fracture site and into the distal ulnar aspect of the proximal phalanx.  Fluoroscopy was used to confirm reduction and wire placement the wire was then measured for length of the intended screw which was measured to be a 30 mm screw.  Another K wire for the Acumed system was then inserted from the volar ulnar base of the proximal phalanx across the fracture site and into the distal radial aspect of the proximal phalanx.  This wire was then again measured and found to be a 30 mm screw length wire.  Both wires were then driven out distally and the large diameter area to allow the small diameter K wire portion to remain in bone.  The 30 mm screws were then placed across the fracture site.  Fluoroscopy was then used to reevaluate the finger fracture.  The fracture was found to be stable with the screw construct and reduced with acceptable alignment.  The hand was washed.  The pin sites were sealed with glue and Steri-Strips.  Sterile dressings and a splint were applied. The patient was awoken and transported to the recovery room in stable condition.     Tourniquet time:  NA    I was present for the entire procedure., A qualified resident physician was not available and a physician assistant was required during the procedure for retraction, tissue handling, dissection and suturing.     Complications:   None     Patient Disposition:  PACU     Plan:  Keep dressing on, clean and dry until follow-up  Dressing change to be performed at 2 weeks in office or with the therapist  Please remain nonweightbearing of the affected extremity  Follow up in office in 2 weeks              SIGNATURE: Luis Brito MD  DATE: November 8, 2024  TIME: 4:09 PM

## 2024-11-09 NOTE — ANESTHESIA PROCEDURE NOTES
Peripheral Block    Patient location during procedure: PACU  Start time: 11/8/2024 5:20 PM  Reason for block: procedure for pain, at surgeon's request and post-op pain management  Staffing  Performed by: Gilberto Galvan MD PhD  Authorized by: Gilberto Galvan MD PhD    Preanesthetic Checklist  Completed: patient identified, IV checked, site marked, risks and benefits discussed, surgical consent, monitors and equipment checked, pre-op evaluation and timeout performed  Peripheral Block  Patient position: sitting  Prep: ChloraPrep  Patient monitoring: frequent blood pressure checks, continuous pulse oximetry and heart rate  Block type: Supraclavicular  Laterality: right  Injection technique: single-shot  Procedures: ultrasound guided, Ultrasound guidance required for the procedure to increase accuracy and safety of medication placement and decrease risk of complications.  Ultrasound permanent image saved  bupivacaine (PF) (MARCAINE) 0.5 % injection 20 mL - Perineural   15 mL - 11/8/2024 5:20:00 PM  dexamethasone (DECADRON) injection 4 mg - Perineural   4 mg - 11/8/2024 5:20:00 PM  Needle  Needle type: Stimuplex   Needle gauge: 20 G  Needle length: 4 in  Needle localization: anatomical landmarks and ultrasound guidance  Assessment  Injection assessment: incremental injection, frequent aspiration, injected with ease, negative aspiration, negative for heart rate change, no paresthesia on injection, no symptoms of intraneural/intravenous injection and needle tip visualized at all times  Paresthesia pain: none  Post-procedure:  site cleaned  patient tolerated the procedure well with no immediate complications

## 2024-11-09 NOTE — ANESTHESIA POSTPROCEDURE EVALUATION
Post-Op Assessment Note    CV Status:  Stable  Pain Score: 10    Pain management: inadequate       Mental Status:  Awake, alert and anxious   Hydration Status:  Stable   PONV Controlled:  None   Airway Patency:  Patent     Post Op Vitals Reviewed: Yes    No anethesia notable event occurred.    Staff: Anesthesiologist           Last Filed PACU Vitals:  Vitals Value Taken Time   Temp 97.2 °F (36.2 °C) 11/08/24 1625   Pulse 55 11/08/24 1710   /79 11/08/24 1710   Resp 20 11/08/24 1710   SpO2 100 % 11/08/24 1710       Modified Christian:  Activity: 2 (11/8/2024  5:45 PM)  Respiration: 2 (11/8/2024  5:45 PM)  Circulation: 2 (11/8/2024  5:45 PM)  Consciousness: 2 (11/8/2024  5:45 PM)  Oxygen Saturation: 2 (11/8/2024  5:45 PM)  Modified Christian Score: 10 (11/8/2024  5:45 PM)    Post Op Interval Evaluation    Current medical need: Severe right thumb pain  Plan:   I discussed at length with the patient regarding his possible options: attempt to manage his pain with non-opiate pharmacological and non-pharmacological techniques, have an opiate administered, or have a regional technique used to numb his right arm including his thumb.    The patient expressed great hesitancy to take any form of opiate medication but also that he felt his pain was much to excessive for him to be able to tolerate without a significant intervention. I discussed the risks and benefits of a regional approach - including risks of non-success, infection, bleeding, damage to the nerve or other local structures. The patient desired a nerve block with effects lasting for hours to days but did not desire a longer acting block such as might be achieved with liposomal bupivicaine. Therefore I agreed to administered a regional block.      Gilberto Galvan MD PhD  Anesthesiology and Critical Care, [facility]  Phone: [phone]  Fax: [phone]  Email:  [email or web address]  November 8, 2024

## 2024-11-26 ENCOUNTER — TELEPHONE (OUTPATIENT)
Dept: OBGYN CLINIC | Facility: HOSPITAL | Age: 24
End: 2024-11-26

## 2024-11-26 NOTE — TELEPHONE ENCOUNTER
Caller: Patient    Doctor: Daryl    Reason for call: Patient had a PO appt this morning and missed it because he had wrong time written down. He said he is only 5 min away and could you see him today.  Please call patient. Thank you.    Call back#: [phone]

## 2024-11-26 NOTE — TELEPHONE ENCOUNTER
Caller: [place]    Doctor: Dr Brito -  OPEN REDUCTION W/ INTERNAL FIXATION (ORIF)  FINGER -   11/08     The patient rescheduled today's appt until tomorrow in [place]     He will please need a work note

## 2024-11-27 ENCOUNTER — OFFICE VISIT (OUTPATIENT)
Dept: OBGYN CLINIC | Facility: CLINIC | Age: 24
End: 2024-11-27

## 2024-11-27 VITALS
TEMPERATURE: 99.7 F | BODY MASS INDEX: 18.9 KG/M2 | SYSTOLIC BLOOD PRESSURE: 124 MMHG | RESPIRATION RATE: 16 BRPM | WEIGHT: 135 LBS | DIASTOLIC BLOOD PRESSURE: 77 MMHG | HEART RATE: 60 BPM | OXYGEN SATURATION: 99 % | HEIGHT: 71 IN

## 2024-11-27 DIAGNOSIS — S62.511A CLOSED DISPLACED FRACTURE OF PROXIMAL PHALANX OF RIGHT THUMB, INITIAL ENCOUNTER: Primary | ICD-10-CM

## 2024-11-27 PROCEDURE — 99024 POSTOP FOLLOW-UP VISIT: CPT | Performed by: STUDENT IN AN ORGANIZED HEALTH CARE EDUCATION/TRAINING PROGRAM

## 2024-11-27 NOTE — LETTER
November 27, 2024     Patient: Everette Carson  YOB: 2000  Date of Visit: 11/27/2024      To Whom it May Concern:    Everette Carson is under my professional care beginning on November 5, 2024 until this time. Everette was seen in my office on 11/27/2024. Everette sustained a serious injury to the right hand requiring surgical intervention. Everette has restrictions of no use of the right upper extremity until next follow-up appointment on December 4th at which time re-evaluation will be performed.     If you have any questions or concerns, please don't hesitate to call.         Sincerely,          Luis Brito MD        CC: No Recipients

## 2024-11-27 NOTE — PROGRESS NOTES
Orthopedic Surgery Management Note  Everette Carson (24 y.o. male)  : 2000 Encounter Date: 2024    Assessment/Plan:  24 y.o. male 2.5 weeks follow up status post right thumb ORIF.  He had minimal tenderness about the thumb today and he already has decent range of motion of the IP and MP joints.  The incisions are all well-healed.  He is neuro intact.  I discussed with the patient that at this point his thumb is stable for him to start range of motion exercises.  I do think the implant is stable enough for him to start a 5 pound limit of the right upper extremity.  We discussed that if he feels stable in a thumb spica brace I may potentially allow him to return to work with even more aggressive freedom in terms of precautions.  Patient expressed understanding.  He will see how he feels for the next week we will revisit his station at that time.  All questions answered.    Immobilization: Splint removed today. Thumb spica brace to be worn 23 hours per day for the next two weeks.   Weight bearing up to 5lbs with right upper extremity  Pain control: over-the-counter analgesics as needed  Encouraged patient to start working on range of motion of the right thumb  Work note provided today.   Return in about 1 week (around 2024).      Subjective:  24 y.o. male presenting to the office for 2.5 weeks follow up, status post right ORIF of thumb.  DOS: 2024    Patient states that their pain is present - adequately treated   Patient denies symptoms of an infection.     Current concerns: Patient denies any numbness or tingling.     Review of Systems  Review of systems negative unless otherwise specified in HPI    Past Medical History  Past Medical History:   Diagnosis Date    Anxiety     Known health problems: none     Lead poisoning     As child     Past Surgical History  Past Surgical History:   Procedure Laterality Date    NO PAST SURGERIES      ORIF FINGER FRACTURE Right 2024    Procedure: OPEN  REDUCTION W/ INTERNAL FIXATION (ORIF)  FINGER - right thumb;  Surgeon: Luis Brito MD;  Location:  MAIN OR;  Service: Orthopedics    WISDOM TOOTH EXTRACTION       Current Medications  Current Outpatient Medications on File Prior to Visit   Medication Sig Dispense Refill    acetaminophen (TYLENOL) 500 mg tablet Take 2 tablets (1,000 mg total) by mouth every 8 (eight) hours as needed for mild pain 40 tablet 0    naproxen (EC NAPROSYN) 500 MG EC tablet Take 1 tablet (500 mg total) by mouth 2 (two) times a day with meals 30 tablet 0     No current facility-administered medications on file prior to visit.       Physical exam  Exam: right thumb  Inspection: Surgical puncture holes are healing well without erythema, drainage, or signs of acute infection. Moderate soft tissue swelling  Range of Motion: Limited due to stiffness but he is able to flex the IP joint of the thumb to 50 degrees in the MP joint to 30 degrees.  EPL and FPL intact  Neurovascular status: Neuro intact, good cap refill      Imaging:  No new imaging    Scribe Attestation      I,:  Daphney Banerjee PA-C am acting as a scribe while in the presence of the attending physician.:       I,:  Luis Brito MD personally performed the services described in this documentation    as scribed in my presence.:

## 2024-12-09 ENCOUNTER — OFFICE VISIT (OUTPATIENT)
Dept: OBGYN CLINIC | Facility: CLINIC | Age: 24
End: 2024-12-09

## 2024-12-09 VITALS
HEIGHT: 71 IN | RESPIRATION RATE: 16 BRPM | DIASTOLIC BLOOD PRESSURE: 60 MMHG | TEMPERATURE: 98.5 F | BODY MASS INDEX: 19.04 KG/M2 | OXYGEN SATURATION: 98 % | WEIGHT: 136 LBS | HEART RATE: 60 BPM | SYSTOLIC BLOOD PRESSURE: 108 MMHG

## 2024-12-09 DIAGNOSIS — S62.511A CLOSED DISPLACED FRACTURE OF PROXIMAL PHALANX OF RIGHT THUMB, INITIAL ENCOUNTER: Primary | ICD-10-CM

## 2024-12-09 PROCEDURE — 99024 POSTOP FOLLOW-UP VISIT: CPT | Performed by: STUDENT IN AN ORGANIZED HEALTH CARE EDUCATION/TRAINING PROGRAM

## 2024-12-09 NOTE — PROGRESS NOTES
Orthopedic Surgery Management Note  Everette Carson (24 y.o. male)  : 2000 Encounter Date: 2024    Assessment/Plan:  24 y.o. male 4 weeks follow up status post right thumb ORIF.  He has excellent range of motion on today's examination has no pain of the thumb.  He can have active range of motion as tolerated.  I would like him to avoid aggressive activities with the thumb for at least the next 2 weeks and then can progress as tolerated.  For his work specifically I discussed with him that I would like him to wear the thumb spica brace as he returns to work and he can wean out of this starting at 6 weeks postop.  All questions were answered    Immobilization: Continue thumb spica brace while at work for 2 weeks. Then can discontinue brace.   Weight bearing: 10 pound weightbearing limit for the next 4 weeks and weightbearing as tolerated  Pain control: over-the-counter analgesics as needed   Follow-up in 2 weeks with new x-rays      Subjective:  24 y.o. male presenting to the office for 4 weeks follow up, status post right ORIF of thumb.  DOI: 2024    Patient states that their pain is present - adequately treated   Patient denies symptoms of an infection.     Review of Systems  Review of systems negative unless otherwise specified in HPI    Past Medical History  Past Medical History:   Diagnosis Date    Anxiety     Known health problems: none     Lead poisoning     As child     Past Surgical History  Past Surgical History:   Procedure Laterality Date    NO PAST SURGERIES      ORIF FINGER FRACTURE Right 2024    Procedure: OPEN REDUCTION W/ INTERNAL FIXATION (ORIF)  FINGER - right thumb;  Surgeon: Luis Brito MD;  Location: [facility];  Service: Orthopedics    WISDOM TOOTH EXTRACTION       Current Medications  Current Outpatient Medications on File Prior to Visit   Medication Sig Dispense Refill    acetaminophen (TYLENOL) 500 mg tablet Take 2 tablets (1,000 mg total) by mouth every 8 (eight)  hours as needed for mild pain 40 tablet 0    naproxen (EC NAPROSYN) 500 MG EC tablet Take 1 tablet (500 mg total) by mouth 2 (two) times a day with meals 30 tablet 0     No current facility-administered medications on file prior to visit.       Physical exam  Exam: right hand    Inspection: Skin intact. No soft tissue swelling ,incisions well-healed  No tenderness to palpation  Range of Motion: He has excellent thumb range of motion is able to bring the tip of the thumb down to the distal palmar crease of the small finger.  Neurovascular status: Neuro intact      Imaging:  No new imaging    Scribe Attestation      I,:  Daphney Banerjee PA-C am acting as a scribe while in the presence of the attending physician.:       I,:  Luis Brito MD personally performed the services described in this documentation    as scribed in my presence.:

## 2024-12-09 NOTE — LETTER
December 9, 2024     Patient: Everette Carson  YOB: 2000  Date of Visit: 12/9/2024      To Whom it May Concern:    Everette Carson is under my professional care. Everette was seen in my office on 12/9/2024. Everette may return to work on 12/16/2024. Allow Everette to wear brace on right hand while at work .    If you have any questions or concerns, please don't hesitate to call.         Sincerely,          Luis Brito MD        CC: No Recipients

## (undated) DEVICE — ASTOUND STANDARD SURGICAL GOWN, XL: Brand: CONVERTORS

## (undated) DEVICE — C-ARM: Brand: UNBRANDED

## (undated) DEVICE — STERILE BETHLEHEM PLASTIC HAND: Brand: CARDINAL HEALTH

## (undated) DEVICE — ACE WRAP 3 IN STERILE

## (undated) DEVICE — SYRINGE 10ML LL

## (undated) DEVICE — GLOVE SRG BIOGEL 7.5

## (undated) DEVICE — CUFF TOURNIQUET 18 X 4 IN QUICK CONNECT DISP 1 BLADDER

## (undated) DEVICE — INFRAME INSTRUMENT KIT FOR 2.0MM: Brand: ACUMED

## (undated) DEVICE — PADDING CAST 4 IN  COTTON STRL

## (undated) DEVICE — SPONGE SCRUB 4 PCT CHLORHEXIDINE

## (undated) DEVICE — GLOVE INDICATOR PI UNDERGLOVE SZ 7.5 BLUE

## (undated) DEVICE — NEEDLE 25G X 1 1/2

## (undated) DEVICE — SPLINT 3 X 12 IN PRECUT SYNTHETIC